# Patient Record
Sex: FEMALE | Race: OTHER | HISPANIC OR LATINO | Employment: PART TIME | URBAN - METROPOLITAN AREA
[De-identification: names, ages, dates, MRNs, and addresses within clinical notes are randomized per-mention and may not be internally consistent; named-entity substitution may affect disease eponyms.]

---

## 2018-03-17 ENCOUNTER — HOSPITAL ENCOUNTER (EMERGENCY)
Facility: HOSPITAL | Age: 23
Discharge: HOME | End: 2018-03-18
Attending: EMERGENCY MEDICINE | Admitting: EMERGENCY MEDICINE

## 2018-03-17 DIAGNOSIS — T78.40XA ALLERGIC REACTION: Primary | ICD-10-CM

## 2018-03-17 PROCEDURE — 96374 THER/PROPH/DIAG INJ IV PUSH: CPT

## 2018-03-17 PROCEDURE — 96372 THER/PROPH/DIAG INJ SC/IM: CPT

## 2018-03-17 PROCEDURE — 96375 TX/PRO/DX INJ NEW DRUG ADDON: CPT

## 2018-03-17 RX ORDER — DEXAMETHASONE SODIUM PHOSPHATE 10 MG/ML
10 INJECTION, SOLUTION INTRAMUSCULAR; INTRAVENOUS ONCE
Status: COMPLETED | OUTPATIENT
Start: 2018-03-17 | End: 2018-03-17

## 2018-03-17 RX ORDER — DIPHENHYDRAMINE HYDROCHLORIDE 50 MG/ML
25 INJECTION INTRAMUSCULAR; INTRAVENOUS ONCE
Status: COMPLETED | OUTPATIENT
Start: 2018-03-17 | End: 2018-03-17

## 2018-03-17 RX ORDER — EPINEPHRINE 1 MG/ML
0.3 INJECTION, SOLUTION, CONCENTRATE INTRAVENOUS ONCE
Status: COMPLETED | OUTPATIENT
Start: 2018-03-17 | End: 2018-03-17

## 2018-03-17 RX ADMIN — FAMOTIDINE 20 MG: 10 INJECTION, SOLUTION INTRAVENOUS at 23:37

## 2018-03-17 RX ADMIN — EPINEPHRINE 0.3 MG: 1 INJECTION, SOLUTION, CONCENTRATE INTRAVENOUS at 23:36

## 2018-03-17 RX ADMIN — DEXAMETHASONE SODIUM PHOSPHATE 10 MG: 10 INJECTION, SOLUTION INTRAMUSCULAR; INTRAVENOUS at 23:32

## 2018-03-17 RX ADMIN — DIPHENHYDRAMINE HYDROCHLORIDE 25 MG: 50 INJECTION, SOLUTION INTRAMUSCULAR; INTRAVENOUS at 23:31

## 2018-03-18 VITALS
BODY MASS INDEX: 26.97 KG/M2 | RESPIRATION RATE: 18 BRPM | DIASTOLIC BLOOD PRESSURE: 56 MMHG | SYSTOLIC BLOOD PRESSURE: 111 MMHG | HEIGHT: 61 IN | WEIGHT: 142.86 LBS | OXYGEN SATURATION: 98 % | HEART RATE: 96 BPM

## 2018-03-18 PROCEDURE — 99283 EMERGENCY DEPT VISIT LOW MDM: CPT

## 2018-03-18 RX ORDER — EPINEPHRINE 0.3 MG/.3ML
0.3 INJECTION SUBCUTANEOUS ONCE
Qty: 0.6 ML | Refills: 0 | Status: SHIPPED | OUTPATIENT
Start: 2018-03-18 | End: 2018-03-18

## 2018-03-18 NOTE — ED PROVIDER NOTES
History  Chief Complaint   Patient presents with    Allergic Reaction     Patient states she feels like she is having an allergic reaction after trying a new liquid to vape with  History provided by:  Patient   used: No    Allergic Reaction   Presenting symptoms: difficulty swallowing and itching    Presenting symptoms: no rash    Severity:  Moderate  Prior allergic episodes:  No prior episodes  Relieved by:  Nothing  Worsened by:  Nothing  Ineffective treatments:  None tried      None       History reviewed  No pertinent past medical history  History reviewed  No pertinent surgical history  History reviewed  No pertinent family history  I have reviewed and agree with the history as documented  Social History   Substance Use Topics    Smoking status: Current Some Day Smoker    Smokeless tobacco: Never Used      Comment: vape     Alcohol use Yes      Comment: socially         Review of Systems   Constitutional: Negative for activity change, appetite change, fatigue, fever and unexpected weight change  HENT: Positive for trouble swallowing  Negative for congestion, rhinorrhea and sore throat  Eyes: Negative for pain and visual disturbance  Respiratory: Positive for chest tightness and shortness of breath  Negative for cough  Cardiovascular: Negative for chest pain  Gastrointestinal: Negative for abdominal pain, diarrhea, nausea and vomiting  Endocrine: Negative for polyphagia and polyuria  Genitourinary: Negative for difficulty urinating, dysuria, flank pain, frequency and urgency  Musculoskeletal: Negative for back pain, gait problem, neck pain and neck stiffness  Skin: Positive for itching  Negative for color change and rash  itching   Allergic/Immunologic: Negative for immunocompromised state  Neurological: Negative for dizziness, syncope, speech difficulty, light-headedness, numbness and headaches     Hematological: Does not bruise/bleed easily  Psychiatric/Behavioral: Negative for confusion and decreased concentration  Physical Exam  ED Triage Vitals [03/17/18 2316]   Temp Pulse Respirations Blood Pressure SpO2   -- 97 18 130/77 98 %      Temp src Heart Rate Source Patient Position - Orthostatic VS BP Location FiO2 (%)   -- Monitor Sitting Right arm --      Pain Score       --           Orthostatic Vital Signs  Vitals:    03/17/18 2316 03/18/18 0136   BP: 130/77 111/56   Pulse: 97 96   Patient Position - Orthostatic VS: Sitting Lying       Physical Exam   Constitutional: She is oriented to person, place, and time  She appears well-developed and well-nourished  HENT:   Head: Normocephalic and atraumatic  No objective signs of lip, tongue, or throat swelling  Uvula is midline and appears normal  Oropharynx is unremarkable  Eyes: Conjunctivae and EOM are normal  Pupils are equal, round, and reactive to light  No scleral icterus  Neck: Normal range of motion  Neck supple  No JVD present  No tracheal deviation present  Cardiovascular: Normal rate and regular rhythm  Pulmonary/Chest: Effort normal and breath sounds normal  No respiratory distress  Abdominal: Soft  She exhibits no distension  There is no tenderness  Musculoskeletal: Normal range of motion  She exhibits no tenderness or deformity  Lymphadenopathy:     She has no cervical adenopathy  Neurological: She is alert and oriented to person, place, and time  No gross focal sensory or motor deficits   Skin: Skin is warm and dry  No rash noted  She is not diaphoretic  Psychiatric: She has a normal mood and affect  Vitals reviewed        ED Medications  Medications   diphenhydrAMINE (BENADRYL) injection 25 mg (25 mg Intravenous Given 3/17/18 2331)   dexamethasone (PF) (DECADRON) injection 10 mg (10 mg Intravenous Given 3/17/18 2332)   EPINEPHrine PF (ADRENALIN) 1 mg/mL injection 0 3 mg (0 3 mg Intramuscular Given 3/17/18 2336)   famotidine (PEPCID) injection 20 mg (20 mg Intravenous Given 3/17/18 1589)       Diagnostic Studies  Results Reviewed     None                 No orders to display              Procedures  Procedures       Phone Contacts  ED Phone Contact    ED Course  ED Course                                MDM  Number of Diagnoses or Management Options  Allergic reaction: new and requires workup  Diagnosis management comments: 57-year-old female presented for evaluation after an apparent allergic reaction  The patient was trying a new vaporized tobacco liquid and suddenly began to feel as if her tongue was swelling  She describes whole body itching and a sensation that she had "something stuck in her throat " Patient denies ever having a similar reaction in the past   He denies any known allergies  She has no known medical problems and takes no medications  No recent fevers or illnesses  She described shortness of breath and a sensation of a "tightness" in her chest     Patient was treated with epinephrine, Benadryl, Decadron in the emergency department  She had excellent resolution of her symptoms  She was observed in the emergency department after several hours and remained manic  She will be discharged home to primary care provider  We discussed avoiding triggers  Patient was also given prescription for an epinephrine pen with discussed return precautions  Amount and/or Complexity of Data Reviewed  Review and summarize past medical records: yes      The patient presented with a condition in which there was a high probability of imminent or life-threatening deterioration, and critical care services (excluding separately billable procedures) totalled 30-74 minutes  Disposition  Final diagnoses:    Allergic reaction     Time reflects when diagnosis was documented in both MDM as applicable and the Disposition within this note     Time User Action Codes Description Comment    3/18/2018  1:43 AM Simin FERNANDEZ Add [[id number] [id number]] Allergic reaction ED Disposition     ED Disposition Condition Comment    Discharge  [facility] discharge to home/self care  Condition at discharge: Good        Follow-up Information     Follow up With Specialties Details Why Jose Manuel Raya MD Internal Medicine  Please follow-up with your primary care provider in the next 2 to 3 days  If you have any new or worsening symptoms please call your doctor or return to the emergency department  [address] [address]  [phone]          Discharge Medication List as of 3/18/2018  1:46 AM      START taking these medications    Details   EPINEPHrine (EPIPEN) 0 3 mg/0 3 mL SOAJ Inject 0 3 mL (0 3 mg total) into the shoulder, thigh, or buttocks once for 1 dose, Starting Sun 3/18/2018, Print           No discharge procedures on file      ED Provider  Electronically Signed by           Joaquin Veliz MD  03/29/18 1969

## 2018-03-18 NOTE — DISCHARGE INSTRUCTIONS
General Allergic Reaction   WHAT YOU NEED TO KNOW:   An allergic reaction is your body's response to an allergen  Allergens include medicines, food, insect stings, animal dander, mold, latex, chemicals, and dust mites  Pollen from trees, grass, and weeds can also cause an allergic reaction  DISCHARGE INSTRUCTIONS:   Return to the emergency department if:   · You have a skin rash, hives, swelling, or itching that gets worse  · You have trouble breathing, shortness of breath, wheezing, or coughing  · Your throat tightens, or your lips or tongue swell  · You have trouble swallowing or speaking  · You have dizziness, lightheadedness, fainting, or confusion  · You have nausea, vomiting, diarrhea, or abdominal cramps  · You have chest pain or tightness  Contact your healthcare provider if:   · You have questions or concerns about your condition or care  Medicines:   · Medicines  may be given to relieve certain allergy symptoms such as itching, sneezing, and swelling  You may take them as a pill or use drops in your nose or eyes  Topical treatments may be given to put directly on your skin to help decrease itching or swelling  · Take your medicine as directed  Contact your healthcare provider if you think your medicine is not helping or if you have side effects  Tell him of her if you are allergic to any medicine  Keep a list of the medicines, vitamins, and herbs you take  Include the amounts, and when and why you take them  Bring the list or the pill bottles to follow-up visits  Carry your medicine list with you in case of an emergency  Follow up with your healthcare provider as directed:  Write down your questions so you remember to ask them during your visits  Self-care:   · Avoid the allergen  that you think may have caused your allergic reaction  · Use cold compresses  on your skin or eyes if they were affected by the allergic reaction   Cold compresses may help to soothe your skin or eyes  · Rinse your nasal passages  with a saline solution  Daily rinsing may help clear your nose of allergens  · Do not smoke  Your allergy symptoms may decrease if you are not around smoke  Nicotine and other chemicals in cigarettes and cigars can also cause lung damage  Ask your healthcare provider for information if you currently smoke and need help to quit  E-cigarettes or smokeless tobacco still contain nicotine  Talk to your healthcare provider before you use these products  © 2017 [address] [facility] Information is for End User's use only and may not be sold, redistributed or otherwise used for commercial purposes  All illustrations and images included in CareNotes® are the copyrighted property of [employer]  or Torsten Ayala  The above information is an  only  It is not intended as medical advice for individual conditions or treatments  Talk to your doctor, nurse or pharmacist before following any medical regimen to see if it is safe and effective for you